# Patient Record
Sex: MALE | Race: WHITE
[De-identification: names, ages, dates, MRNs, and addresses within clinical notes are randomized per-mention and may not be internally consistent; named-entity substitution may affect disease eponyms.]

---
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
## 2021-04-05 ENCOUNTER — HOSPITAL ENCOUNTER (EMERGENCY)
Dept: HOSPITAL 97 - ER | Age: 21
LOS: 1 days | Discharge: HOME | End: 2021-04-06
Payer: COMMERCIAL

## 2021-04-05 DIAGNOSIS — T18.128A: Primary | ICD-10-CM

## 2021-04-05 DIAGNOSIS — Z72.0: ICD-10-CM

## 2021-04-05 LAB
HCT VFR BLD CALC: 41.2 % (ref 39.6–49)
LYMPHOCYTES # SPEC AUTO: 2.2 K/UL (ref 0.7–4.9)
PMV BLD: 8.9 FL (ref 7.6–11.3)
RBC # BLD: 4.74 M/UL (ref 4.33–5.43)

## 2021-04-05 PROCEDURE — 71045 X-RAY EXAM CHEST 1 VIEW: CPT

## 2021-04-05 PROCEDURE — 99284 EMERGENCY DEPT VISIT MOD MDM: CPT

## 2021-04-05 PROCEDURE — 85025 COMPLETE CBC W/AUTO DIFF WBC: CPT

## 2021-04-05 PROCEDURE — 36415 COLL VENOUS BLD VENIPUNCTURE: CPT

## 2021-04-05 PROCEDURE — 80048 BASIC METABOLIC PNL TOTAL CA: CPT

## 2021-04-05 PROCEDURE — 96374 THER/PROPH/DIAG INJ IV PUSH: CPT

## 2021-04-06 LAB
BUN BLD-MCNC: 9 MG/DL (ref 7–18)
GLUCOSE SERPLBLD-MCNC: 112 MG/DL (ref 74–106)
POTASSIUM SERPL-SCNC: 3.5 MMOL/L (ref 3.5–5.1)

## 2021-04-06 NOTE — ER
Nurse's Notes                                                                                     

 [facility]                                                                 

Name: Eloy Saul                                                                               

Age: 20 yrs                                                                                       

Sex: Male                                                                                         

: 2000                                                                                   

MRN: [id number]                                                                                   

Arrival Date: 2021                                                                          

Time: 22:47                                                                                       

Account#: [id number]                                                                            

Bed 4                                                                                             

Private MD:                                                                                       

Diagnosis: Foreign body ( piece of chicken ) lodged in esophagus ( Resolved )                     

                                                                                                  

Presentation:                                                                                     

                                                                                             

22:47 Chief complaint: EMS states: Pt was eating chicken and swallowed a piece he thought was jb4 

      too large. He reports feeling like it is stuck and cannot swallow anything. Coronavirus     

      screen: Client denies travel out of the [place] in the last 14 days. At this time, the         

      client does not indicate any symptoms associated with coronavirus-19. Ebola Screen: No      

      symptoms or risks identified at this time. Initial Sepsis Screen: Does the patient meet     

      any 2 criteria? No. Patient's initial sepsis screen is negative. Does the patient have      

      a suspected source of infection? No. Patient's initial sepsis screen is negative. Risk      

      Assessment: Do you want to hurt yourself or someone else? Patient reports no desire to      

      harm self or others. Onset of symptoms was 2021.                                  

22:47 Method Of Arrival: EMS: [facility]                                                       jb4 

22:47 Acuity: ALBERTO 3                                                                           jb4 

                                                                                                  

Triage Assessment:                                                                                

22:49 General: Appears in no apparent distress. uncomfortable, Behavior is calm, cooperative, jb4 

      appropriate for age. Pain: Complains of pain in throat Pain does not radiate. Pain          

      currently is 5 out of 10 on a pain scale. EENT: Throat is clear is pink with gag reflex     

      present. Neuro: Level of Consciousness is awake, alert, obeys commands, Oriented to         

      person, place, time, situation. Cardiovascular: Patient's skin is warm and dry.             

      Respiratory: Airway is compromised Respiratory effort is even, unlabored, Respiratory       

      pattern is regular, symmetrical. GI: No signs and/or symptoms were reported involving       

      the gastrointestinal system. : No signs and/or symptoms were reported regarding the       

      genitourinary system. Derm: Skin is intact, Skin is pink, warm \T\ dry. Musculoskeletal:    

      Circulation, motion, and sensation intact. Range of motion: intact in all extremities.      

                                                                                                  

Historical:                                                                                       

- Allergies:                                                                                      

22:49 No Known Allergies;                                                                     jb4 

- Home Meds:                                                                                      

22:49 None [Active];                                                                          jb4 

- PMHx:                                                                                           

22:49 Asthma; Migraines; seasonal allergies;                                                  jb4 

- PSHx:                                                                                           

22:49 None;                                                                                   jb4 

                                                                                                  

- Immunization history:: Adult Immunizations up to date.                                          

- Social history:: Smoking status: Patient reports the use of cigarette tobacco                   

  products, Patient uses street drugs, marijuana, Patient/guardian denies using alcohol.          

                                                                                                  

                                                                                                  

Screenin:49 Abuse screen: Denies threats or abuse. Nutritional screening: No deficits noted.        jb4 

      Tuberculosis screening: No symptoms or risk factors identified.                             

22:49 Fall Risk None identified.                                                              jb4 

                                                                                                  

Assessment:                                                                                       

22:49 General: see triage note..                                                              jb4 

23:58 Reassessment: Patient appears in no apparent distress at this time. Patient and/or      jb4 

      family updated on plan of care and expected duration. Pain level reassessed. Patient is     

      alert, oriented x 3, equal unlabored respirations, skin warm/dry/pink. Pt reports that      

      it feels as though the food passed. Requested water to try and swallow. Provider            

      notified, Provider okayed pt to try and drink water. Pt was able to swallow water           

      without immediately vomiting.                                                               

                                                                                                  

Vital Signs:                                                                                      

22:47  / 79; Pulse 88; Resp 16; Temp 98.5(O); Pulse Ox 100% on R/A; Weight 65.77 kg     jb4 

      (R); Height 5 ft. 4 in. (162.56 cm) (R); Pain 6/10;                                         

23:59  / 69; Pulse 58; Resp 16; Pulse Ox 98% on R/A;                                    jb4 

22:47 Body Mass Index 24.89 (65.77 kg, 162.56 cm)                                             jb4 

                                                                                                  

ED Course:                                                                                        

22:47 Patient arrived in ED.                                                                  jb4 

22:48 Triage completed.                                                                       jb4 

22:49 Arm band placed on right wrist.                                                         jb4 

22:49 Patient has correct armband on for positive identification. Placed in gown. Bed in low  jb4 

      position. Call light in reach. Side rails up X 1. Pulse ox on. NIBP on.                     

23:11 Valdemar Abdullahi MD is Attending Physician.                                                    pkl 

23:18 Dutch Beaulieu, RN is Primary Nurse.                                                     jb4 

23:30 Initial lab(s) drawn, by me, sent to lab. Inserted saline lock: 18 gauge in right       jb4 

      antecubital area, using aseptic technique. Blood collected.                                 

23:55 XRAY CXR (1 view) In Process Unspecified.                                               EDMS

                                                                                             

00:21 No provider procedures requiring assistance completed. IV discontinued, intact,         jb4 

      bleeding controlled, No redness/swelling at site. Pressure dressing applied.                

                                                                                                  

Administered Medications:                                                                         

      Discontinued: NS 0.9% 1000 ml IV at 125 ml/hr continuous                                    

                                                                                             

23:37 Drug: NS 0.9% 1000 ml Route: IV; Rate: 125 ml/hr; Site: right antecubital;              jb4 

23:37 Drug: GlucaGen (glucagon) 1 mg Route: IVP; Site: right antecubital;                     jb4 

23:55 Follow up: Response: No adverse reaction; Marked relief of symptoms                     jb4 

                                                                                             

00:02 Not Given (Physician Discretion): Procardia 10 mg PO once                               jb4 

                                                                                                  

                                                                                                  

Outcome:                                                                                          

00:12 Discharge ordered by MD.                                                                kailey 

00:22 Discharged to home ambulatory.                                                          jb4 

00:22 Condition: stable                                                                           

00:22 Discharge instructions given to patient, Instructed on discharge instructions, follow       

      up and referral plans. Demonstrated understanding of instructions, follow-up care.          

00:22 Patient left the ED.                                                                    jb4 

                                                                                                  

Signatures:                                                                                       

Dispatcher [employer]                           EDValdemar Singh MD MD pkl Bryson, James, RN                       RN   jb4                                                  

                                                                                                  

**************************************************************************************************

## 2021-04-06 NOTE — RAD REPORT
EXAM DESCRIPTION:  Darren Single View4/5/2021 11:55 pm

 

CLINICAL HISTORY:  Foreign body in esophagus

 

COMPARISON:  2009

 

FINDINGS:   The lungs appear clear of acute infiltrate. The heart is normal size. A radiopaque foreig
n body is not seen on this limited examination

## 2021-04-06 NOTE — EDPHYS
Physician Documentation                                                                           

 [facility]                                                                 

Name: Eloy Saul                                                                               

Age: 20 yrs                                                                                       

Sex: Male                                                                                         

: 2000                                                                                   

MRN: [id number]                                                                                   

Arrival Date: 2021                                                                          

Time: 22:47                                                                                       

Account#: [id number]                                                                            

Bed 4                                                                                             

Private MD:                                                                                       

ED Physician Valdemar Abdlulahi                                                                             

HPI:                                                                                              

                                                                                             

00:02 This 20 yrs old  Male presents to ER via EMS with unknown complaint.           pkl 

00:02 The patient or guardian reports the patient has a suspected foreign body, Patient       pkl 

      swallowed a piece of chicken and was lodged in the esophagus earlier today.                 

                                                                                                  

Historical:                                                                                       

- Allergies:                                                                                      

                                                                                             

22:49 No Known Allergies;                                                                     jb4 

- Home Meds:                                                                                      

22:49 None [Active];                                                                          jb4 

- PMHx:                                                                                           

22:49 Asthma; Migraines; seasonal allergies;                                                  jb4 

- PSHx:                                                                                           

22:49 None;                                                                                   jb4 

                                                                                                  

- Immunization history:: Adult Immunizations up to date.                                          

- Social history:: Smoking status: Patient reports the use of cigarette tobacco                   

  products, Patient uses street drugs, marijuana, Patient/guardian denies using alcohol.          

                                                                                                  

                                                                                                  

ROS:                                                                                              

                                                                                             

00:02 Eyes: Negative for injury, pain, redness, and discharge, ENT: Negative for injury,      pkl 

      pain, and discharge, Neck: Negative for injury, pain, and swelling, Cardiovascular:         

      Negative for chest pain, palpitations, and edema, Respiratory: Negative for shortness       

      of breath, cough, wheezing, and pleuritic chest pain, Abdomen/GI: Negative for              

      abdominal pain, nausea, vomiting, diarrhea, and constipation, Back: Negative for injury     

      and pain, : Negative for injury, bleeding, discharge, and swelling, MS/Extremity:         

      Negative for injury and deformity, Skin: Negative for injury, rash, and discoloration,      

      Neuro: Negative for headache, weakness, numbness, tingling, and seizure.                    

                                                                                                  

Exam:                                                                                             

00:02 Head/Face:  Normocephalic, atraumatic. Eyes:  Pupils equal round and reactive to light, pkl 

      extra-ocular motions intact.  Lids and lashes normal.  Conjunctiva and sclera are           

      non-icteric and not injected.  Cornea within normal limits.  Periorbital areas with no      

      swelling, redness, or edema.                                                                

00:02 ENT: Unable  to  swallow  any  liquids.                                                     

00:02 Neck: Exam negative for acute changes.                                                      

00:02 Chest/axilla: Exam negative for acute changes.                                              

00:02 Cardiovascular: Rate: normal, Rhythm: regular.                                              

00:02 Respiratory: the patient does not display signs of respiratory distress,  Respirations:     

      normal, Breath sounds: are clear throughout.                                                

00:02 Abdomen/GI: Bowel sounds: normal, Palpation: abdomen is soft and non-tender, in all         

      quadrants.                                                                                  

00:02 Back: Exam negative for acute changes.                                                      

00:02 : Exam negative for acute changes.                                                        

00:02 Musculoskeletal/extremity: Exam is negative for acute changes.                              

00:02 Skin: Exam negative for rash.                                                               

00:02 Neuro: Orientation: is normal, Mentation: is normal, Cranial nerves: grossly normal,        

      Motor: is normal.                                                                           

                                                                                                  

Vital Signs:                                                                                      

                                                                                             

22:47  / 79; Pulse 88; Resp 16; Temp 98.5(O); Pulse Ox 100% on R/A; Weight 65.77 kg     jb4 

      (R); Height 5 ft. 4 in. (162.56 cm) (R); Pain 6/10;                                         

23:59  / 69; Pulse 58; Resp 16; Pulse Ox 98% on R/A;                                    jb4 

22:47 Body Mass Index 24.89 (65.77 kg, 162.56 cm)                                             jb4 

                                                                                                  

MDM:                                                                                              

                                                                                             

00:02 Data reviewed: vital signs, nurses notes.                                               pkl 

00:02 ED course: Patient responded well after IV Glucagon. Able to swallow liquids without    pkl 

      difficulty.                                                                                 

00:12 Patient medically screened.                                                             pkl 

                                                                                                  

                                                                                             

23:18 Order name: CBC with Diff; Complete Time: 00:15                                         pkl 

                                                                                             

23:18 Order name: Chem 7; Complete Time: 00:15                                                pkl 

                                                                                             

23:18 Order name: XRAY CXR (1 view)                                                           pkl 

                                                                                                  

Administered Medications:                                                                         

      Discontinued: NS 0.9% 1000 ml IV at 125 ml/hr continuous                                    

                                                                                             

23:37 Drug: NS 0.9% 1000 ml Route: IV; Rate: 125 ml/hr; Site: right antecubital;              jb 

23:37 Drug: GlucaGen (glucagon) 1 mg Route: IVP; Site: right antecubital;                     jb4 

23:55 Follow up: Response: No adverse reaction; Marked relief of symptoms                     [facility] 

                                                                                             

00:02 Not Given (Physician Discretion): Procardia 10 mg PO once                               jb4 

                                                                                                  

                                                                                                  

Disposition:                                                                                      

21 00:12 Discharged to Home. Impression: Foreign body ( piece of chicken ) lodged in        

  esophagus ( Resolved ).                                                                         

- Condition is Stable.                                                                            

                                                                                                  

                                                                                                  

- Medication Reconciliation Form, Thank You Letter, Antibiotic Education, Prescription            

  Opioid Use form.                                                                                

- Follow up: Private Physician; When: 2 - 3 days; Reason: Re-evaluation by your                   

  physician.                                                                                      

- Problem is new.                                                                                 

- Symptoms are resolved.                                                                          

                                                                                                  

                                                                                                  

                                                                                                  

Signatures:                                                                                       

Dispatcher [employer]                           EDMS                                                 

Valdemar Abdullahi MD MD   pkl                                                  

Dutch Beaulieu RN                       RN   jb4                                                  

                                                                                                  

Corrections: (The following items were deleted from the chart)                                    

00:22 00:12 2021 00:12 Discharged to Home. Impression: Foreign body ( piece of chicken  jb4 

      ) lodged in esophagus ( Resolved ). Condition is Stable. Forms are Medication               

      Reconciliation Form, Thank You Letter, Antibiotic Education, Prescription Opioid Use.       

      Follow up: Private Physician; When: 2 - 3 days; Reason: Re-evaluation by your               

      physician. Problem is new. Symptoms are resolved. pkl                                       

                                                                                                  

**************************************************************************************************

## 2021-10-16 ENCOUNTER — HOSPITAL ENCOUNTER (EMERGENCY)
Dept: HOSPITAL 97 - ER | Age: 21
Discharge: HOME | End: 2021-10-16
Payer: COMMERCIAL

## 2021-10-16 VITALS — DIASTOLIC BLOOD PRESSURE: 77 MMHG | SYSTOLIC BLOOD PRESSURE: 126 MMHG | OXYGEN SATURATION: 100 % | TEMPERATURE: 98.3 F

## 2021-10-16 DIAGNOSIS — R10.84: ICD-10-CM

## 2021-10-16 DIAGNOSIS — R19.7: Primary | ICD-10-CM

## 2021-10-16 LAB
ALBUMIN SERPL BCP-MCNC: 4.8 G/DL (ref 3.4–5)
ALP SERPL-CCNC: 75 U/L (ref 45–117)
ALT SERPL W P-5'-P-CCNC: 29 U/L (ref 12–78)
AST SERPL W P-5'-P-CCNC: 25 U/L (ref 15–37)
BUN BLD-MCNC: 9 MG/DL (ref 7–18)
GLUCOSE SERPLBLD-MCNC: 105 MG/DL (ref 74–106)
HCT VFR BLD CALC: 44.2 % (ref 39.6–49)
LIPASE SERPL-CCNC: 66 U/L (ref 73–393)
LYMPHOCYTES # SPEC AUTO: 1.8 K/UL (ref 0.7–4.9)
PMV BLD: 9 FL (ref 7.6–11.3)
POTASSIUM SERPL-SCNC: 4 MMOL/L (ref 3.5–5.1)
RBC # BLD: 5.03 M/UL (ref 4.33–5.43)

## 2021-10-16 PROCEDURE — 80076 HEPATIC FUNCTION PANEL: CPT

## 2021-10-16 PROCEDURE — 96361 HYDRATE IV INFUSION ADD-ON: CPT

## 2021-10-16 PROCEDURE — 71045 X-RAY EXAM CHEST 1 VIEW: CPT

## 2021-10-16 PROCEDURE — 99284 EMERGENCY DEPT VISIT MOD MDM: CPT

## 2021-10-16 PROCEDURE — 74177 CT ABD & PELVIS W/CONTRAST: CPT

## 2021-10-16 PROCEDURE — 83690 ASSAY OF LIPASE: CPT

## 2021-10-16 PROCEDURE — 96375 TX/PRO/DX INJ NEW DRUG ADDON: CPT

## 2021-10-16 PROCEDURE — 96374 THER/PROPH/DIAG INJ IV PUSH: CPT

## 2021-10-16 PROCEDURE — 36415 COLL VENOUS BLD VENIPUNCTURE: CPT

## 2021-10-16 PROCEDURE — 85025 COMPLETE CBC W/AUTO DIFF WBC: CPT

## 2021-10-16 PROCEDURE — 80048 BASIC METABOLIC PNL TOTAL CA: CPT

## 2021-10-16 NOTE — RAD REPORT
EXAM DESCRIPTION:  RAD - Chest Single View - 10/16/2021 6:40 pm

 

CLINICAL HISTORY:  COUGH

 

COMPARISON:  April 5

 

TECHNIQUE:  AP portable chest image was obtained 10/16/2021 6:40 pm .

 

FINDINGS:  Lungs are clear. Heart and vasculature are normal. No measurable pleural effusion and no p
neumothorax. No acute bony abnormality seen. No acute aortic findings suspected.

 

IMPRESSION:  No acute cardiopulmonary process.

 

No significant change from comparison study.

## 2021-10-16 NOTE — EDPHYS
Physician Documentation                                                                           

 [facility]                                                                 

Name: Eloy Saul                                                                               

Age: 20 yrs                                                                                       

Sex: Male                                                                                         

: 2000                                                                                   

MRN: [id number]                                                                                   

Arrival Date: 10/16/2021                                                                          

Time: 14:22                                                                                       

Account#: [id number]                                                                            

Bed 23                                                                                            

Private MD:                                                                                       

ED Physician Adolph Yusuf                                                                      

HPI:                                                                                              

10/16                                                                                             

16:53 This 20 yrs old  Male presents to ER via EMS with complaints of Vomiting.      jayesh 

16:53 The patient presents to the emergency department with.                                  jayesh 

                                                                                                  

Historical:                                                                                       

- Allergies:                                                                                      

16:35 No Known Allergies;                                                                     lp1 

- Home Meds:                                                                                      

16:35 None [Active];                                                                          lp1 

- PMHx:                                                                                           

16:35 Asthma; Migraines; seasonal allergies;                                                  lp1 

                                                                                                  

- Immunization history:: Adult Immunizations up to date.                                          

- Social history:: Smoking status: Patient denies any tobacco usage or history of.                

                                                                                                  

                                                                                                  

ROS:                                                                                              

17:43 Constitutional: Negative for fever, chills, and weight loss, Eyes: Negative for injury, jayesh 

      pain, redness, and discharge, ENT: Negative for injury, pain, and discharge, Neck:          

      Negative for injury, pain, and swelling, Cardiovascular: Negative for chest pain,           

      palpitations, and edema, Respiratory: Negative for shortness of breath, cough,              

      wheezing, and pleuritic chest pain, Back: Negative for injury and pain, : Negative        

      for injury, bleeding, discharge, and swelling, MS/Extremity: Negative for injury and        

      deformity, Skin: Negative for injury, rash, and discoloration, Neuro: Negative for          

      headache, weakness, numbness, tingling, and seizure, Psych: Negative for depression,        

      anxiety, suicide ideation, homicidal ideation, and hallucinations, Allergy/Immunology:      

      Negative for hives, rash, and allergies, Endocrine: Negative for neck swelling,             

      polydipsia, polyuria, polyphagia, and marked weight changes, Hematologic/Lymphatic:         

      Negative for swollen nodes, abnormal bleeding, and unusual bruising.                        

17:43 Abdomen/GI: Positive for abdominal pain, nausea and vomiting, diarrhea, of the              

      suprapubic area, right upper quadrant, left upper quadrant, right lower quadrant and        

      left lower quadrant.                                                                        

                                                                                                  

Exam:                                                                                             

17:43 Constitutional:  This is a well developed, well nourished patient who is awake, alert,  jayesh 

      and in no acute distress. Head/Face:  Normocephalic, atraumatic. Eyes:  Pupils equal        

      round and reactive to light, extra-ocular motions intact.  Lids and lashes normal.          

      Conjunctiva and sclera are non-icteric and not injected.  Cornea within normal limits.      

      Periorbital areas with no swelling, redness, or edema. ENT:  Nares patent. No nasal         

      discharge, no septal abnormalities noted.  Tympanic membranes are normal and external       

      auditory canals are clear.  Oropharynx with no redness, swelling, or masses, exudates,      

      or evidence of obstruction, uvula midline.  Mucous membranes moist. Neck:  Trachea          

      midline, no thyromegaly or masses palpated, and no cervical lymphadenopathy.  Supple,       

      full range of motion without nuchal rigidity, or vertebral point tenderness.  No            

      Meningismus. Chest/axilla:  Normal chest wall appearance and motion.  Nontender with no     

      deformity.  No lesions are appreciated. Cardiovascular:  Regular rate and rhythm with a     

      normal S1 and S2.  No gallops, murmurs, or rubs.  Normal PMI, no JVD.  No pulse             

      deficits. Respiratory:  Lungs have equal breath sounds bilaterally, clear to                

      auscultation and percussion.  No rales, rhonchi or wheezes noted.  No increased work of     

      breathing, no retractions or nasal flaring. Back:  No spinal tenderness.  No                

      costovertebral tenderness.  Full range of motion. Male :  Normal genitalia with no        

      discharge or lesions. Skin:  Warm, dry with normal turgor.  Normal color with no            

      rashes, no lesions, and no evidence of cellulitis. MS/ Extremity:  Pulses equal, no         

      cyanosis.  Neurovascular intact.  Full, normal range of motion. Neuro:  Awake and           

      alert, GCS 15, oriented to person, place, time, and situation.  Cranial nerves II-XII       

      grossly intact.  Motor strength 5/5 in all extremities.  Sensory grossly intact.            

      Cerebellar exam normal.  Normal gait. Psych:  Awake, alert, with orientation to person,     

      place and time.  Behavior, mood, and affect are within normal limits.                       

17:43 Abdomen/GI: Inspection: abdomen appears normal, Bowel sounds: normal, Palpation: mild       

      abdominal tenderness, in all quadrants, Liver: no appreciated palpable abnormalities,       

      Hernia: not appreciated.                                                                    

                                                                                                  

Vital Signs:                                                                                      

14:22  / 81; Pulse 56; Resp 16; Temp 97.8(O); Pulse Ox 100% on R/A; Weight 63.5 kg (R); lp1 

      Height 5 ft. 5 in. (165.10 cm); Pain 0/10;                                                  

17:01  / 79; Pulse 53; Resp 18; Pulse Ox 100% on R/A;                                   aj1 

18:20  / 80; Pulse 51; Resp 16; Pulse Ox 99% ;                                          aj1 

19:29  / 77 LA Sitting (auto/reg); Pulse 75; Resp 14 S; Temp 98.3(O); Pulse Ox 100% on  sj1 

      R/A; Pain 0/10;                                                                             

14:22 Body Mass Index 23.30 (63.50 kg, 165.10 cm)                                             lp1 

                                                                                                  

MDM:                                                                                              

16:52 Patient medically screened.                                                             jayesh 

17:44 Differential diagnosis: Nonspecific abd pain. Data reviewed: vital signs, nurses notes, [facility] 

      lab test result(s), EKG, radiologic studies, CT scan, plain films.                          

17:47 Data interpreted: Cardiac monitor: not applicable for this patient encounter. rate is   jayesh 

      53 beats/min, rhythm is regular, Pulse oximetry: on room air is 100 %. Counseling: I        

      had a detailed discussion with the patient and/or guardian regarding: the historical        

      points, exam findings, and any diagnostic results supporting the discharge/admit            

      diagnosis, lab results, radiology results, the need for outpatient follow up, for           

      definitive care, a family practitioner, a gastroenterologist.                               

                                                                                                  

10/16                                                                                             

14:52 Order name: Basic Metabolic Panel                                                       1 

10/16                                                                                             

14:52 Order name: CBC with Diff                                                               lp1 

10/16                                                                                             

14:52 Order name: Hepatic Function                                                            lp1 

10/16                                                                                             

14:52 Order name: Lipase; Complete Time: 16:52                                                [facility] 

10/16                                                                                             

14:53 Order name: Basic Metabolic Panel; Complete Time: 16:52                                 EDMS

10/16                                                                                             

14:53 Order name: CBC with Automated Diff; Complete Time: 16:52                               EDMS

10/16                                                                                             

14:53 Order name: Liver (Hepatic) Function; Complete Time: 16:52                              EDMS

10/16                                                                                             

17:43 Order name: Chest Single View XRAY                                                      [facility] 

10/16                                                                                             

17:43 Order name: CT Abd/Pelvis - IV Contrast Only                                            [facility] 

10/16                                                                                             

14:52 Order name: IV Saline Lock; Complete Time: 15:00                                        lp1 

10/16                                                                                             

14:52 Order name: Labs collected and sent; Complete Time: 15:00                               lp1 

                                                                                                  

Administered Medications:                                                                         

15:07 Drug: NS 0.9% 1000 ml Route: IV; Rate: 1000 ml; Site: right antecubital;                lp1 

19:16 Follow up: IV Status: Completed infusion; IV Intake: 1000ml                             aj1 

15:07 Drug: Phenergan (promethazine) 12.5 mg Route: IVP; Site: right antecubital;             lp1 

19:15 Follow up: Response: No adverse reaction                                                aj1 

18:19 Drug: NS 0.9% 1000 ml Route: IV; Rate: 1 bolus; Site: right antecubital;                aj1 

18:19 Drug: Zofran (Ondansetron) 4 mg Route: IVP; Site: right antecubital;                    aj1 

19:16 Follow up: Response: No adverse reaction                                                aj1 

19:16 Not Given (Patient Refused): morphine 2 mg IVP once; RASS on ADMIN: Combtv4, Very       aj1 

      Agttd3, Agttd2, Rstlss1, AlertClm0, Drwsy-1, Lt Sdtn-2, Mod Sdtn-3, Dp Sdtn-4,              

      UnArsble-5                                                                                  

19:16 Not Given (Physician Discretion): NS 0.9% 1000 ml IV at 1 bolus Per protocol; 1000 mL   aj1 

      bolus                                                                                       

                                                                                                  

                                                                                                  

Disposition Summary:                                                                              

10/16/21 18:38                                                                                    

Discharge Ordered                                                                                 

      Location: Home                                                                          jayesh 

      Problem: new                                                                            jayesh 

      Symptoms: have improved                                                                 jayesh 

      Condition: Stable                                                                       jayesh 

      Diagnosis                                                                                   

        - Vomiting                                                                            jayesh 

        - Diarrhea, unspecified                                                               jayesh 

        - Abdominal pain, Generalized                                                         jayesh 

      Followup:                                                                               jayesh 

        - With: Private Physician                                                                  

        - When: 2 - 3 days                                                                         

        - Reason: Recheck today's complaints, Continuance of care, Re-evaluation by your           

      physician                                                                                   

      Followup:                                                                               jayesh 

        - With:                                                                                    

        - When: 2 - 3 days                                                                         

        - Reason: Recheck today's complaints, Re-evaluation by your physician                      

      Discharge Instructions:                                                                     

        - Discharge Summary Sheet                                                             jayesh 

        - Abdominal Pain, Adult                                                               jayesh 

        - Food Choices to Help Relieve Diarrhea, Adult                                        jayesh 

        - Diarrhea, Adult                                                                     jayesh 

        - Abdominal Pain, Adult, Easy-to-Read                                                 jayesh 

        - Diarrhea, Adult, Easy-to-Read                                                       jayesh 

      Forms:                                                                                      

        - Medication Reconciliation Form                                                      jayesh 

        - Thank You Letter                                                                    jayesh 

        - Antibiotic Education                                                                jayesh 

        - Prescription Opioid Use                                                             jayesh 

      Prescriptions:                                                                              

        - Pepcid 20 mg Oral Tablet                                                                 

            - take 1 tablet by ORAL route every 12 hours for 15 days; 30 tablet; Refills: 0,  jayesh 

      Product Selection Permitted                                                                 

        - Zofran 4 mg Oral Tablet                                                                  

            - take 1 tablet by ORAL route every 12 hours As needed; 20 tablet; Refills: 0,    jayesh 

      Product Selection Permitted                                                                 

        - dicyclomine 20 mg Oral Tablet                                                            

            - take 1 tablet by ORAL route 4 times per day; 28 tablet; Refills: 0, Product     jayesh 

      Selection Permitted                                                                         

Signatures:                                                                                       

Dispatcher [employer]                           Katelynn Morris, RN                     RN   aj1                                                  

Adolph Yusuf MD MD cha Pena, Laura, RN                         RN   lp1                                                  

                                                                                                  

Corrections: (The following items were deleted from the chart)                                    

19:31 18:14 Urine Dipstick-Ancillary ordered. jayesh                                             sj1 

                                                                                                  

**************************************************************************************************

## 2021-10-16 NOTE — RAD REPORT
EXAM DESCRIPTION:  CT - Abdomen   Pelvis W Contrast - 10/16/2021 5:58 pm

 

CLINICAL HISTORY:  ABD PAIN

 

COMPARISON:  <Comparisons>

 

TECHNIQUE:  Biphasic, helical CT imaging of the abdomen and pelvis was performed following 100 ml non
-ionic IV contrast.

 

No oral contrast administered.

 

All CT scans are performed using dose optimization technique as appropriate and may include automated
 exposure control or mA/KV adjustment according to patient size.

 

FINDINGS:  No suspicious findings in the lung bases.

 

The liver, spleen, and pancreas show no suspicious findings. Gallbladder and biliary tree are also wi
thout suspicious finding.

 

Symmetric renal function is seen with no hydronephrosis or suspicious renal mass. No pyelonephritis o
r acute parenchymal process. No bladder abnormalities. No adrenal abnormalities.

 

No dilated bowel loops or bowel wall thickening. No appendicitis findings. No free air, free fluid or
 inflammatory stranding.  No hernia, mass or bulky lymphadenopathy.

 

No suspicious bony findings.

 

 

IMPRESSION:  Contrast enhanced CT abdomen and pelvis showing no significant or suspicious finding.

## 2021-10-16 NOTE — ER
Nurse's Notes                                                                                     

 [facility]                                                                 

Name: Eloy Saul                                                                               

Age: 20 yrs                                                                                       

Sex: Male                                                                                         

: 2000                                                                                   

MRN: [id number]                                                                                   

Arrival Date: 10/16/2021                                                                          

Time: 14:22                                                                                       

Account#: [id number]                                                                            

Bed 23                                                                                            

Private MD:                                                                                       

Diagnosis: Vomiting;Diarrhea, unspecified;Abdominal pain, Generalized                             

                                                                                                  

Presentation:                                                                                     

10/16                                                                                             

14:22 Chief complaint: EMS states: Called for vomiting that began at 0700 today, reports may  lp1 

      have eaten something bad; patient's mother gave him Phenergan and Zofran without            

      relief; EMS administered Phenergan 12.5mg IV and NS 250ml; Patient reports continued        

      nausea.                                                                                     

14:22 Coronavirus screen: At this time, the client does not indicate any symptoms associated  lp1 

      with coronavirus-19. Ebola Screen: No symptoms or risks identified at this time.            

      Initial Sepsis Screen: Does the patient meet any 2 criteria? No. Patient's initial          

      sepsis screen is negative. Does the patient have a suspected source of infection? No.       

      Patient's initial sepsis screen is negative. Risk Assessment: Do you want to hurt           

      yourself or someone else? Patient reports no desire to harm self or others. Onset of        

      symptoms was 2021.                                                              

14:22 Method Of Arrival: EMS: [facility]                                                lp1 

14:22 Acuity: ALBERTO 3                                                                           lp1 

14:59 Care prior to arrival: IV initiated. 20 GA, in the right antecubital area.              lp1 

                                                                                                  

Historical:                                                                                       

- Allergies:                                                                                      

16:35 No Known Allergies;                                                                     lp1 

- Home Meds:                                                                                      

16:35 None [Active];                                                                          lp1 

- PMHx:                                                                                           

16:35 Asthma; Migraines; seasonal allergies;                                                  lp1 

                                                                                                  

- Immunization history:: Adult Immunizations up to date.                                          

- Social history:: Smoking status: Patient denies any tobacco usage or history of.                

                                                                                                  

                                                                                                  

Screenin:35 Abuse screen: Denies threats or abuse. Denies injuries from another. Nutritional        lp1 

      screening: No deficits noted. Tuberculosis screening: No symptoms or risk factors           

      identified. Fall Risk None identified.                                                      

                                                                                                  

Assessment:                                                                                       

17:01 General: Appears in no apparent distress. uncomfortable, Behavior is calm, cooperative, aj1 

      appropriate for age. Pain: Denies pain. Neuro: Level of Consciousness is awake, alert,      

      obeys commands, Oriented to person, place, time, situation. Cardiovascular: Patient's       

      skin is warm and dry. Respiratory: Airway is patent Respiratory effort is even,             

      unlabored, Respiratory pattern is regular, symmetrical. GI: Abdomen is flat,                

      non-distended, Bowel sounds present X 4 quads. Abd is soft X 4 quads Reports diarrhea,      

      nausea, vomiting. : No signs and/or symptoms were reported regarding the                  

      genitourinary system. EENT: No signs and/or symptoms were reported regarding the EENT       

      system. Derm: Skin is pale. Musculoskeletal: No signs and/or symptoms reported              

      regarding the musculoskeletal system. Circulation, motion, and sensation intact.            

18:19 Reassessment: Patient appears in no apparent distress at this time. No changes from     aj1 

      previously documented assessment. Patient and/or family updated on plan of care and         

      expected duration. Pain level reassessed. Patient is alert, oriented x 3, equal             

      unlabored respirations, skin warm/dry/pink.                                                 

                                                                                                  

Vital Signs:                                                                                      

14:22  / 81; Pulse 56; Resp 16; Temp 97.8(O); Pulse Ox 100% on R/A; Weight 63.5 kg (R); lp1 

      Height 5 ft. 5 in. (165.10 cm); Pain 0/10;                                                  

17:01  / 79; Pulse 53; Resp 18; Pulse Ox 100% on R/A;                                   aj1 

18:20  / 80; Pulse 51; Resp 16; Pulse Ox 99% ;                                          aj1 

19:29  / 77 LA Sitting (auto/reg); Pulse 75; Resp 14 S; Temp 98.3(O); Pulse Ox 100% on  sj1 

      R/A; Pain 0/10;                                                                             

14:22 Body Mass Index 23.30 (63.50 kg, 165.10 cm)                                             lp1 

                                                                                                  

ED Course:                                                                                        

14:22 Patient arrived in ED.                                                                  am2 

14:52 Triage completed.                                                                       lp1 

14:52 Arm band placed on.                                                                     lp1 

15:07 Initial lab(s) drawn, by me, sent to lab. Maintain EMS IV. Dressing intact. Good blood  lp1 

      return noted. Site clean \T\ dry. Gauge \T\ site: 20g to R AC.                              

16:35 Patient has correct armband on for positive identification.                             lp1 

16:52 Adolph Yusuf MD is Attending Physician.                                             [facility] 

16:57 Katelynn Banuelos RN is Primary Nurse.                                                   aj1 

17:01 No provider procedures requiring assistance completed.                                  aj1 

17:57 CT Abd/Pelvis - IV Contrast Only In Process Unspecified.                                EDMS

18:38 Vickie Guajardo MD is Referral Physician.                                                  [facility] 

18:40 Chest Single View XRAY In Process Unspecified.                                          EDMS

19:29 IV discontinued, intact, bleeding controlled, No redness/swelling at site.              sj1 

19:31 Basic Metabolic Panel Sent.                                                             sj1 

19:31 CBC with Diff Sent.                                                                     sj1 

19:31 Hepatic Function Sent.                                                                  sj1 

                                                                                                  

Administered Medications:                                                                         

15:07 Drug: NS 0.9% 1000 ml Route: IV; Rate: 1000 ml; Site: right antecubital;                lp1 

19:16 Follow up: IV Status: Completed infusion; IV Intake: 1000ml                             aj1 

15:07 Drug: Phenergan (promethazine) 12.5 mg Route: IVP; Site: right antecubital;             lp1 

19:15 Follow up: Response: No adverse reaction                                                aj1 

18:19 Drug: NS 0.9% 1000 ml Route: IV; Rate: 1 bolus; Site: right antecubital;                aj1 

18:19 Drug: Zofran (Ondansetron) 4 mg Route: IVP; Site: right antecubital;                    aj1 

19:16 Follow up: Response: No adverse reaction                                                aj1 

19:16 Not Given (Patient Refused): morphine 2 mg IVP once; RASS on ADMIN: Combtv4, Very       aj1 

      Agttd3, Agttd2, Rstlss1, AlertClm0, Drwsy-1, Lt Sdtn-2, Mod Sdtn-3, Dp Sdtn-4,              

      UnArsble-5                                                                                  

19:16 Not Given (Physician Discretion): NS 0.9% 1000 ml IV at 1 bolus Per protocol; 1000 mL   aj1 

      bolus                                                                                       

                                                                                                  

                                                                                                  

Intake:                                                                                           

19:16 IV: 1000ml; Total: 1000ml.                                                              aj1 

                                                                                                  

Outcome:                                                                                          

18:38 Discharge ordered by MD.                                                                jayesh 

19:29 Discharged to home ambulatory.                                                          sj1 

19:29 Condition: stable                                                                           

19:29 Discharge instructions given to patient, Instructed on discharge instructions, follow       

      up and referral plans. medication usage, Demonstrated understanding of instructions,        

      follow-up care, medications.                                                                

19:32 Patient left the ED.                                                                    sj1 

                                                                                                  

Signatures:                                                                                       

Dispatcher [employer]                           EDMS                                                 

Katelynn Banuelos RN                     RN   aj1                                                  

Adolph Yusuf MD MD cha Pena, Laura RN                         RN   lp1                                                  

Pooja Cedillo                               am2                                                  

Valeria Banuelos, RN                       RN   sj1                                                  

                                                                                                  

**************************************************************************************************

## 2022-04-09 ENCOUNTER — HOSPITAL ENCOUNTER (EMERGENCY)
Dept: HOSPITAL 97 - ER | Age: 22
Discharge: HOME | End: 2022-04-09
Payer: COMMERCIAL

## 2022-04-09 VITALS — SYSTOLIC BLOOD PRESSURE: 138 MMHG | OXYGEN SATURATION: 98 % | DIASTOLIC BLOOD PRESSURE: 71 MMHG | TEMPERATURE: 97.9 F

## 2022-04-09 DIAGNOSIS — S50.812A: Primary | ICD-10-CM
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
## 2022-04-09 DIAGNOSIS — J45.909: ICD-10-CM
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
## 2022-04-09 DIAGNOSIS — F17.290: ICD-10-CM

## 2022-04-09 PROCEDURE — 99283 EMERGENCY DEPT VISIT LOW MDM: CPT

## 2022-04-09 PROCEDURE — 90471 IMMUNIZATION ADMIN: CPT

## 2022-04-09 PROCEDURE — 90714 TD VACC NO PRESV 7 YRS+ IM: CPT

## 2022-04-09 NOTE — EDPHYS
Physician Documentation                                                                           

 [facility]                                                                 

Name: Eloy Saul                                                                               

Age: 21 yrs                                                                                       

Sex: Male                                                                                         

: 2000                                                                                   

MRN: [id number]                                                                                   

Arrival Date: 2022                                                                          

Time: 20:16                                                                                       

Account#: [id number]                                                                            

Bed 11                                                                                            

Private MD:                                                                                       

ED Physician Nikhil Diaz                                                                         

HPI:                                                                                              

                                                                                             

23:01 This 21 yrs old Male presents to ER via Ambulatory with complaints of Laceration To Arm ms3 

      - Left.                                                                                     

23:01 The patient has a laceration related to: Water bath at work scratched arm occurred at   ms3 

      work, and there are no complicating factors. The injury was accidental. The                 

      laceration(s) is(are) located on the Left forearm. Onset: The symptoms/episode              

      began/occurred just prior to arrival. Associated signs and symptoms: The patient has no     

      apparent associated signs or symptoms. 21-year-old male presents after being scratched      

      at work by a water bath prior to arrival. Patient states his discomfort is a 1/10 and       

      described as stinging. Patient denies alleviating or inciting factors.                      

                                                                                                  

Historical:                                                                                       

- Allergies:                                                                                      

20:32 No Known Allergies;                                                                     ab2 

- PMHx:                                                                                           

20:32 Asthma; Migraines;                                                                      ab2 

                                                                                                  

- Immunization history:: Adult Immunizations unknown.                                             

- Social history:: Smoking status: Reported history of juuling and/or vaping.                     

                                                                                                  

                                                                                                  

ROS:                                                                                              

23:01 Constitutional: Negative for fever, and chills. Eyes: Negative for injury, pain,        ms3 

      redness, and discharge, Neck: Negative for injury, pain, and swelling, Cardiovascular:      

      Negative for chest pain, and palpitations. Respiratory: Negative for shortness of           

      breath, cough, wheezing, and pleuritic chest pain, Abdomen/GI: Negative for abdominal       

      pain, nausea, vomiting, diarrhea, and constipation, MS/Extremity: Negative for injury       

      and deformity.                                                                              

23:01 Skin: Positive for abrasion(s).                                                             

23:01 All other systems are negative.                                                             

                                                                                                  

Exam:                                                                                             

23:01 Constitutional:  This is a well developed, well nourished patient who is awake, alert,  ms3 

      and in no acute distress. Eyes:  Pupils equal round and reactive to light, extra-ocular     

      motions intact.  Lids and lashes normal.  Conjunctiva and sclera are non-icteric and        

      not injected.  Periorbital areas with no swelling, redness, or edema. Neck:  Trachea        

      midline, no cervical lymphadenopathy.  Supple, full range of motion without nuchal          

      rigidity, or vertebral point tenderness.  No Meningismus. Chest/axilla:  Normal chest       

      wall appearance and motion.  Nontender with no deformity.   Cardiovascular:  Regular        

      rate and rhythm with a normal S1 and S2.  No gallops, murmurs, or rubs.  Normal PMI, no     

      JVD.  No pulse deficits. Respiratory:  Lungs have equal breath sounds bilaterally,          

      clear to auscultation and percussion.  No rales, rhonchi or wheezes noted.  No              

      increased work of breathing, no retractions or nasal flaring. Abdomen/GI:  Soft,            

      non-tender, with normal bowel sounds.  No distension or tympany.  No guarding or            

      rebound.  No evidence of tenderness throughout.                                             

23:01 Skin: injury, abrasion(s), moderate sized abrasion noted, 6 cm(s), of the left forearm.     

                                                                                                  

Vital Signs:                                                                                      

20:28  / 71; Pulse 106; Resp 16; Temp 97.9(TE); Pulse Ox 98% on R/A; Weight 65.77 kg;   ab2 

      Height 5 ft. 5 in. (165.10 cm); Pain 0/10;                                                  

20:28 Body Mass Index 24.13 (65.77 kg, 165.10 cm)                                             ab2 

                                                                                                  

MDM:                                                                                              

20:46 Patient medically screened.                                                             ms3 

23:01 Differential diagnosis: superficial laceration, Tetanus booster. Data reviewed: vital   ms3 

      signs, nurses notes. Data interpreted: Pulse oximetry: on room air is 98 %.                 

      Interpretation: normal. Counseling: I had a detailed discussion with the patient and/or     

      guardian regarding: the historical points, exam findings, and any diagnostic results        

      supporting the discharge/admit diagnosis, the need for outpatient follow up, to return      

      to the emergency department if symptoms worsen or persist or if there are any questions     

      or concerns that arise at home. ED course: Discussed physical exam findings with            

      patient. Patient to follow-up with his primary care physician as discussed. All             

      questions were answered. Return precautions discussed include worsening symptoms, or        

      any other concerns.                                                                         

                                                                                                  

Administered Medications:                                                                         

21:14 Drug: ADAcel 0.5 ml {: [employer]. Exp: 2024. Lot #: [id number]. }      lp1 

      Route: IM; Site: left deltoid;                                                              

21:15 Follow up: Response: Medication administered at discharge.                              lp1 

                                                                                                  

                                                                                                  

Disposition Summary:                                                                              

22 20:48                                                                                    

Discharge Ordered                                                                                 

      Location: Home                                                                          ms3 

      Problem: new                                                                            ms3 

      Symptoms: are unchanged                                                                 ms3 

      Condition: Stable                                                                       ms3 

      Diagnosis                                                                                   

        - Abrasion of left forearm                                                            ms3 

      Followup:                                                                               ms3 

        - With: Ronnell Barbosa MD                                                                 

        - When: 2 - 3 days                                                                         

        - Reason: Re-evaluation by your physician                                                  

      Discharge Instructions:                                                                     

        - Discharge Summary Sheet                                                             ms3 

        - Abrasion                                                                            ms3 

      Forms:                                                                                      

        - Medication Reconciliation Form                                                      ms3 

        - Thank You Letter                                                                    ms3 

        - Antibiotic Education                                                                ms3 

        - Prescription Opioid Use                                                             ms3 

Signatures:                                                                                       

Alicia May RN                         RN   lp1                                                  

Nikhil Diaz DO                        DO   ms3                                                  

Fortino Diaz                           ab2                                                  

                                                                                                  

Corrections: (The following items were deleted from the chart)                                    

21:47 21:47 PMHx: seasonal allergies; lp1                                                     lp1 

                                                                                                  

**************************************************************************************************

## 2022-04-09 NOTE — ER
Nurse's Notes                                                                                     

 [facility]                                                                 

Name: Eloy Saul                                                                               

Age: 21 yrs                                                                                       

Sex: Male                                                                                         

: 2000                                                                                   

MRN: [id number]                                                                                   

Arrival Date: 2022                                                                          

Time: 20:16                                                                                       

Account#: [id number]                                                                            

Bed 11                                                                                            

Private MD:                                                                                       

Diagnosis: Abrasion of left forearm                                                               

                                                                                                  

Presentation:                                                                                     

                                                                                             

20:28 Chief complaint: Patient states: "I was at work and accidentally cut my arm on a piece  ab2 

      of [employer]. I don't know when my last tetanus shot was but I am freaking out that       

      I'll get tetanus. I did put liquid Band-Aid on it." Pt has superficial laceration left      

      forearm. Pt denies any pain. Coronavirus screen: Vaccine status: Patient reports            

      receiving the 2nd dose of the covid vaccine. Client denies travel out of the [place] in        

      the last 14 days. At this time, the client does not indicate any symptoms associated        

      with coronavirus-19. Ebola Screen: Patient negative for fever greater than or equal to      

      101.5 degrees Fahrenheit, and additional compatible Ebola Virus Disease symptoms            

      Patient denies exposure to infectious person. Patient denies travel to an                   

      Ebola-affected area in the 21 days before illness onset. No symptoms or risks               

      identified at this time. Complicating Factors: There are no complicating factors for        

      this patient. Initial Sepsis Screen: Does the patient meet any 2 criteria? No.              

      Patient's initial sepsis screen is negative. Does the patient have a suspected source       

      of infection? No. Patient's initial sepsis screen is negative. Risk Assessment: Do you      

      want to hurt yourself or someone else? Patient reports no desire to harm self or            

      others. Onset of symptoms is unknown.                                                       

20:28 Method Of Arrival: Ambulatory                                                           ab2 

20:28 Acuity: ALBERTO 4                                                                           ab2 

                                                                                                  

Triage Assessment:                                                                                

20:32 General: Appears in no apparent distress. comfortable, Behavior is cooperative,         ab2 

      appropriate for age, anxious. Pain: Denies pain. Neuro: No deficits noted. Level of         

      Consciousness is awake, alert, obeys commands, Oriented to person, place, time,             

      situation, Appropriate for age  are equal bilaterally. Cardiovascular: No deficits     

      noted. Denies chest pain, shortness of breath. Respiratory: Airway is patent                

      Respiratory effort is even, unlabored, Respiratory pattern is regular, symmetrical. GI:     

      No deficits noted. No signs and/or symptoms were reported involving the                     

      gastrointestinal system. : No deficits noted. No signs and/or symptoms were reported      

      regarding the genitourinary system. Derm: Wound noted dorsal aspect of left forearm.        

      Injury Description: Laceration sustained to dorsal aspect of left forearm is bleeding       

      no active bleeding noted.                                                                   

                                                                                                  

Historical:                                                                                       

- Allergies:                                                                                      

20:32 No Known Allergies;                                                                     ab2 

- PMHx:                                                                                           

20:32 Asthma; Migraines;                                                                      ab2 

                                                                                                  

- Immunization history:: Adult Immunizations unknown.                                             

- Social history:: Smoking status: Reported history of juuling and/or vaping.                     

                                                                                                  

                                                                                                  

Screenin:00 Abuse screen: Denies threats or abuse. Denies injuries from another. Nutritional        lp1 

      screening: No deficits noted. Tuberculosis screening: No symptoms or risk factors           

      identified. Fall Risk None identified.                                                      

                                                                                                  

Assessment:                                                                                       

21:00 General: Appears in no apparent distress. Behavior is calm, cooperative, appropriate    lp1 

      for age. Pain: Denies pain. Neuro: No deficits noted. Cardiovascular: Patient's skin is     

      warm and dry. Respiratory: Respiratory effort is even, unlabored. Derm: Skin is pink,       

      warm \T\ dry. Wound noted Wound is Multiple superficial abrasions to left forearm, no       

      active bleeding.                                                                            

21:00 Musculoskeletal: Circulation, motion, and sensation intact.                             lp1 

                                                                                                  

Vital Signs:                                                                                      

20:28  / 71; Pulse 106; Resp 16; Temp 97.9(TE); Pulse Ox 98% on R/A; Weight 65.77 kg;   ab2 

      Height 5 ft. 5 in. (165.10 cm); Pain 0/10;                                                  

20:28 Body Mass Index 24.13 (65.77 kg, 165.10 cm)                                             ab2 

                                                                                                  

ED Course:                                                                                        

20:16 Patient arrived in ED.                                                                  kz  

20:26 Nikhil Diaz DO is Attending Physician.                                                ms3 

20:32 Triage completed.                                                                       ab2 

20:33 Arm band placed on right wrist.                                                         ab2 

20:47 Ronnell Barbosa MD is Referral Physician.                                               ms3 

21:00 Patient has correct armband on for positive identification.                             lp1 

21:00 No provider procedures requiring assistance completed. Patient did not have IV access   lp1 

      during this emergency room visit.                                                           

21:00 Wound care: to abrasion, located on dorsal aspect of left forearm was cleaned with      lp1 

      Hibiclens.                                                                                  

                                                                                                  

Administered Medications:                                                                         

21:14 Drug: ADAcel 0.5 ml {: [employer]. Exp: 2024. Lot #: [id number]. }      lp1 

      Route: IM; Site: left deltoid;                                                              

21:15 Follow up: Response: Medication administered at discharge.                              lp1 

                                                                                                  

                                                                                                  

Outcome:                                                                                          

20:48 Discharge ordered by MD.                                                                ms3 

21:15 Discharged to home ambulatory.                                                          lp1 

21:15 Condition: good                                                                             

21:15 Discharge instructions given to patient, Instructed on discharge instructions, follow       

      up and referral plans. Demonstrated understanding of instructions, follow-up care.          

21:15 Patient left the ED.                                                                    lp1 

                                                                                                  

Signatures:                                                                                       

Alicia May RN                         RN   lp1                                                  

Nikhil Diaz DO DO   ms3                                                  

Fortino Diaz Kelly kz                                                   

                                                                                                  

Corrections: (The following items were deleted from the chart)                                    

21:47 21:47 PMHx: seasonal allergies; lp1                                                     lp1 

                                                                                                  

**************************************************************************************************

## 2022-04-12 ENCOUNTER — HOSPITAL ENCOUNTER (EMERGENCY)
Dept: HOSPITAL 97 - ER | Age: 22
Discharge: HOME | End: 2022-04-12
Payer: COMMERCIAL

## 2022-04-12 DIAGNOSIS — M54.6: ICD-10-CM

## 2022-04-12 DIAGNOSIS — S29.012A: Primary | ICD-10-CM

## 2022-04-12 PROCEDURE — 96372 THER/PROPH/DIAG INJ SC/IM: CPT

## 2022-04-12 PROCEDURE — 99284 EMERGENCY DEPT VISIT MOD MDM: CPT

## 2022-04-12 PROCEDURE — 71046 X-RAY EXAM CHEST 2 VIEWS: CPT

## 2022-04-12 NOTE — ER
Nurse's Notes                                                                                     

 [facility]                                                                 

Name: Eloy Saul                                                                               

Age: 21 yrs                                                                                       

Sex: Male                                                                                         

: 2000                                                                                   

MRN: [id number]                                                                                   

Arrival Date: 2022                                                                          

Time: 17:36                                                                                       

Account#: [id number]                                                                            

Bed 9                                                                                             

Private MD:                                                                                       

Diagnosis: Strain of muscle and tendon of back wall of thorax                                     

                                                                                                  

Presentation:                                                                                     

                                                                                             

18:25 Chief complaint: Patient states: back pain when breathing that began 2 weeks ago. Pt    ss  

      reports this happens once a month, lasts a week and usually goes away. Has been told in     

      the past by his PCP that it may be pleurisy. Coronavirus screen: Client denies travel       

      out of the [place] in the last 14 days. Ebola Screen: Patient denies exposure to               

      infectious person. Patient denies travel to an Ebola-affected area in the 21 days           

      before illness onset. Initial Sepsis Screen: Does the patient meet any 2 criteria? No.      

      Patient's initial sepsis screen is negative. Does the patient have a suspected source       

      of infection? No. Patient's initial sepsis screen is negative. Risk Assessment: Do you      

      want to hurt yourself or someone else? Patient reports no desire to harm self or            

      others. Note PT reports he took a Tramadol and "some kind of muscle relaxer" around 1pm     

      today. States that it did help some. Onset of symptoms was 2022.                  

18:25 Method Of Arrival: Ambulatory                                                           ss  

18:25 Acuity: ALBERTO 4                                                                           ss  

                                                                                                  

Triage Assessment:                                                                                

20:23 General: Behavior is calm, cooperative.                                                 ab2 

                                                                                                  

Historical:                                                                                       

- Allergies:                                                                                      

18:28 No Known Allergies;                                                                     ss  

- Home Meds:                                                                                      

18:28 None [Active];                                                                          ss  

- PMHx:                                                                                           

18:28 Asthma; Migraines;                                                                      ss  

- PSHx:                                                                                           

18:28 None;                                                                                   ss  

                                                                                                  

- Immunization history:: Client reports receiving the 2nd dose of the Covid vaccine.              

- Social history:: Smoking status: Reported history of juuling and/or vaping.                     

                                                                                                  

                                                                                                  

Screenin:23 Abuse screen: Denies threats or abuse. Denies injuries from another. Nutritional        ab2 

      screening: No deficits noted. Tuberculosis screening: No symptoms or risk factors           

      identified. Fall Risk None identified.                                                      

                                                                                                  

Assessment:                                                                                       

20:00 General: Appears in no apparent distress. comfortable. Neuro: Level of Consciousness is al4 

      awake, alert, obeys commands, Oriented to person, place, time, situation.                   

      Cardiovascular: Patient's skin is warm and dry. Respiratory: Airway is patent               

      Respiratory effort is unlabored.                                                            

20:23 General: Appears in no apparent distress. comfortable. Pain: Complains of pain in back. ab2 

      Neuro: Level of Consciousness is awake, alert, obeys commands, Oriented to person,          

      place, time, situation, Appropriate for age  are equal bilaterally Moves all           

      extremities. Gait is steady, Speech is normal. Cardiovascular: Heart tones S1 S2            

      present Patient's skin is warm and dry. Rhythm is sinus rhythm. Respiratory: Airway is      

      patent Respiratory effort is even, unlabored, Breath sounds are clear bilaterally. GI:      

      No deficits noted. No signs and/or symptoms were reported involving the                     

      gastrointestinal system. : No deficits noted. No signs and/or symptoms were reported      

      regarding the genitourinary system. Derm: Skin is intact, is healthy with good turgor,      

      Skin is pink, warm \T\ dry.                                                                 

                                                                                                  

Vital Signs:                                                                                      

18:25  / 65; Pulse 87; Resp 16; Temp 98.8(TE); Pulse Ox 99% on R/A; Weight 65.77 kg;    ss  

      Height 5 ft. 5 in. (165.10 cm); Pain 8/10;                                                  

20:22  / 71; Pulse 81; Resp 17; Pulse Ox 100% on R/A;                                   ab2 

18:25 Body Mass Index 24.13 (65.77 kg, 165.10 cm)                                               

                                                                                                  

ED Course:                                                                                        

17:36 Patient arrived in ED.                                                                  mr  

18:28 Triage completed.                                                                       ss  

18:28 Arm band placed on right wrist.                                                         ss  

18:32 Luís Jameson NP is PHCP.                                                           pm1 

18:32 Adolph Yusuf MD is Attending Physician.                                             pm1 

18:55 XRAY Chest Pa And Lat (2 Views) In Process Unspecified.                                 EDMS

19:50 Fortino Eaton is Primary Nurse.                                                     al4 

20:23 No provider procedures requiring assistance completed.                                  ab2 

20:24 Patient has correct armband on for positive identification. Bed in low position. Call   ab2 

      light in reach.                                                                             

20:24 Patient did not have IV access during this emergency room visit.                        ab2 

                                                                                                  

Administered Medications:                                                                         

20:20 Drug: Lidoderm Patch 5 % (700 mg/patch) 1 patches Route: Topical; Site: affected area;  ab2 

20:20 Drug: Ketorolac 60 mg Route: IM; Site: left vastus lateralis;                           ab2 

                                                                                                  

                                                                                                  

Outcome:                                                                                          

20:00 Discharge ordered by MD.                                                                pm1 

20:23 Discharged to home ambulatory.                                                          ab2 

20:23 Condition: good                                                                             

20:23 Discharge instructions given to patient, Instructed on discharge instructions, follow       

      up and referral plans. medication usage, Demonstrated understanding of instructions,        

      follow-up care, medications, Prescriptions given X 3.                                       

20:24 Patient left the ED.                                                                    ab2 

                                                                                                  

Signatures:                                                                                       

Dispatcher [employer]                           EDMS                                                 

Annemarie Levi Shelby, RN                      RN   ss                                                   

Luís Jameson, NP                    NP   pm1                                                  

Fortino Eaton                            alFortino Jones                           ab2                                                  

                                                                                                  

**************************************************************************************************

## 2022-04-12 NOTE — RAD REPORT
EXAM DESCRIPTION:  RAD - Chest Pa And Lat (2 Views) - 4/12/2022 6:53 pm

 

CLINICAL HISTORY:  CHEST PAIN

 

COMPARISON:  Portable 10/16/2021

 

TECHNIQUE:  Frontal and lateral views of the chest were obtained.

 

FINDINGS:  The lungs are clear.   Heart size is normal and central vasculature is within normal limit
s.  No pleural effusion or pneumothorax seen.  No acute bony finding noted.  No aortic abnormality.

 

IMPRESSION:  No acute cardiopulmonary process.

## 2022-04-12 NOTE — EDPHYS
Physician Documentation                                                                           

 [facility]                                                                 

Name: Eloy Saul                                                                               

Age: 21 yrs                                                                                       

Sex: Male                                                                                         

: 2000                                                                                   

MRN: [id number]                                                                                   

Arrival Date: 2022                                                                          

Time: 17:36                                                                                       

Account#: [id number]                                                                            

Bed 9                                                                                             

Private MD:                                                                                       

ED Physician Adolph Yusuf                                                                      

HPI:                                                                                              

                                                                                             

18:32 This 21 yrs old Male presents to ER via Ambulatory with complaints of Breathing         pm1 

      Difficulty.                                                                                 

18:32 The patient has shortness of breath at rest. Onset: The symptoms/episode began/occurred pm1 

      2 week(s) ago. Duration: The symptoms are continuous. The patient's shortness of breath     

      is aggravated by movement, deep breathing, is alleviated by nothing, patient took           

      tramadol prior to arrival for the pain. Associated signs and symptoms: Pertinent            

      negatives: chest pain, fever, nausea, vomiting, Cough. Severity of symptoms: in the         

      emergency department the symptoms are unchanged usually it only lasts for 1 week but he     

      started working out once it improved and it returned. The patient has not experienced       

      similar symptoms in the past. The patient has not recently seen a physician.                

                                                                                                  

Historical:                                                                                       

- Allergies:                                                                                      

18:28 No Known Allergies;                                                                     ss  

- Home Meds:                                                                                      

18:28 None [Active];                                                                          ss  

- PMHx:                                                                                           

18:28 Asthma; Migraines;                                                                      ss  

- PSHx:                                                                                           

18:28 None;                                                                                   ss  

                                                                                                  

- Immunization history:: Client reports receiving the 2nd dose of the Covid vaccine.              

- Social history:: Smoking status: Reported history of juuling and/or vaping.                     

                                                                                                  

                                                                                                  

ROS:                                                                                              

18:32 Constitutional: Negative for fever, chills, and weight loss, Cardiovascular: Negative   pm1 

      for chest pain, palpitations, and edema, Respiratory: Negative for shortness of breath,     

      cough, wheezing, and pleuritic chest pain, Abdomen/GI: Negative for abdominal pain,         

      nausea, vomiting, diarrhea, and constipation.                                               

18:32 : Negative for injury, bleeding, discharge, and swelling, MS/Extremity: Negative for      

      injury and deformity, Skin: Negative for injury, rash, and discoloration.                   

18:32 Back: Positive for Pain in the thoracic area.                                               

18:32 All other systems are negative.                                                             

                                                                                                  

Exam:                                                                                             

18:32 Constitutional:  This is a well developed, well nourished patient who is awake, alert,  pm1 

      and in no acute distress.                                                                   

                                                                                                  

Vital Signs:                                                                                      

18:25  / 65; Pulse 87; Resp 16; Temp 98.8(TE); Pulse Ox 99% on R/A; Weight 65.77 kg;    ss  

      Height 5 ft. 5 in. (165.10 cm); Pain 8/10;                                                  

20:22  / 71; Pulse 81; Resp 17; Pulse Ox 100% on R/A;                                   ab2 

18:25 Body Mass Index 24.13 (65.77 kg, 165.10 cm)                                             ss  

                                                                                                  

MDM:                                                                                              

18:44 Patient medically screened.                                                             pm1 

19:59 Data reviewed: vital signs. Data interpreted: Pulse oximetry: on room air is 99 %.      pm1 

      Interpretation: normal. Counseling: I had a detailed discussion with the patient and/or     

      guardian regarding: the historical points, exam findings, and any diagnostic results        

      supporting the discharge/admit diagnosis, radiology results, the need for outpatient        

      follow up, to return to the emergency department if symptoms worsen or persist or if        

      there are any questions or concerns that arise at home.                                     

                                                                                                  

                                                                                             

18:29 Order name: XRAY Chest Pa And Lat (2 Views); Complete Time: 19:57                       ss  

                                                                                                  

Administered Medications:                                                                         

20:20 Drug: Lidoderm Patch 5 % (700 mg/patch) 1 patches Route: Topical; Site: affected area;  ab2 

20:20 Drug: Ketorolac 60 mg Route: IM; Site: left vastus lateralis;                           ab2 

                                                                                                  

                                                                                                  

Disposition Summary:                                                                              

22 20:00                                                                                    

Discharge Ordered                                                                                 

      Location: Home                                                                          pm1 

      Problem: new                                                                            pm1 

      Symptoms: have improved                                                                 pm1 

      Condition: Stable                                                                       pm1 

      Diagnosis                                                                                   

        - Strain of muscle and tendon of back wall of thorax                                  pm1 

      Followup:                                                                               pm1 

        - With: Emergency Department                                                               

        - When: As needed                                                                          

        - Reason: Worsening of condition                                                           

      Followup:                                                                               pm1 

        - With: Private Physician                                                                  

        - When: 2 - 3 days                                                                         

        - Reason: Recheck today's complaints, Continuance of care, Re-evaluation by your           

      physician                                                                                   

      Discharge Instructions:                                                                     

        - Discharge Summary Sheet                                                             pm1 

        - Muscle Strain                                                                       pm1 

      Forms:                                                                                      

        - Medication Reconciliation Form                                                      pm1 

        - Thank You Letter                                                                    pm1 

        - Antibiotic Education                                                                pm1 

        - Prescription Opioid Use                                                             pm1 

      Prescriptions:                                                                              

        - Lidoderm 5 % Topical adhesive patch,medicated                                            

            - apply 1 patch by TRANSDERMAL route once daily As needed 12 hours on and 12      pm1 

      hours off in a 24 hour period; 10 patch; Refills: 0, Product Selection Permitted            

        - Cyclobenzaprine 10 mg Oral Tablet                                                        

            - take 1 tablet by ORAL route every 8 hours As needed; 30 tablet; Refills: 0,     pm1 

      Product Selection Permitted                                                                 

        - Diclofenac Sodium 75 mg Oral tablet,delayed release (DR/EC)                              

            - take 1 tablet by ORAL route 2 times per day As needed; 30 tablet; Refills: 0,   pm1 

      Product Selection Permitted                                                                 

Addendum:                                                                                         

2022                                                                                        

     18:44 Co-signature as Attending Physician, Adolph Yusuf MD I agree with the assessment and  c
ha

           plan of care.                                                                          

                                                                                                  

Signatures:                                                                                       

Dispatcher [employer]                           [facility]                                                 

Adolph Yusuf MD MD cha Smirch, Shelby, RN                      RN   ss                                                   

Luís Jameson, NP                    NP   pm1                                                  

Fortino Diaz                                                  

                                                                                                  

**************************************************************************************************

## 2022-04-13 VITALS — SYSTOLIC BLOOD PRESSURE: 117 MMHG | OXYGEN SATURATION: 100 % | DIASTOLIC BLOOD PRESSURE: 71 MMHG

## 2022-04-13 VITALS — TEMPERATURE: 98.8 F

## 2024-12-02 ENCOUNTER — HOSPITAL ENCOUNTER (EMERGENCY)
Dept: HOSPITAL 97 - ER | Age: 24
Discharge: HOME | End: 2024-12-02
Payer: COMMERCIAL

## 2024-12-02 VITALS — SYSTOLIC BLOOD PRESSURE: 126 MMHG | OXYGEN SATURATION: 98 % | DIASTOLIC BLOOD PRESSURE: 78 MMHG

## 2024-12-02 VITALS — TEMPERATURE: 97.2 F

## 2024-12-02 DIAGNOSIS — N50.82: Primary | ICD-10-CM

## 2024-12-02 LAB
UA COMPLETE W REFLEX CULTURE PNL UR: (no result)
UA COMPLETE W REFLEX CULTURE PNL UR: (no result)

## 2024-12-02 PROCEDURE — 99283 EMERGENCY DEPT VISIT LOW MDM: CPT

## 2024-12-02 PROCEDURE — 76870 US EXAM SCROTUM: CPT

## 2024-12-02 PROCEDURE — 81001 URINALYSIS AUTO W/SCOPE: CPT

## 2024-12-02 NOTE — RAD REPORT
EXAM:   US Scrotum



CLINICAL HISTORY:   The patient is 23 years old and is Male; PAIN



TECHNIQUE:   Real-time ultrasound of the scrotum with color Doppler and image documentation.



COMPARISON:   No relevant prior studies available.



FINDINGS:



  Right testicle:   Unremarkable.   No mass.   No torsion.



  Left testicle:   Unremarkable.   No mass.   No torsion.



  Epididymides:   Unremarkable.



  Scrotum:   Trace right hydrocele.



IMPRESSION:      



  No acute findings in the scrotum.



Electronically signed by:   Ronnell Kincaid MD   12/02/2024 05:52 AM [facility] Workstation: [id number]
8



Due to temporary technical issues with the PACS/Power scribe reporting system, reports are being sign
ed by 

the in-house radiologist without review as a courtesy to ensure prompt reporting the interpreting rad
iologist is fully 

responsible for the content of the report. 



Transcribed Date/Time: 12/2/2024 7:17 AM



Reported By: Jefe Beaulieu 

Electronically Signed:  12/2/2024 12:26 PM

## 2024-12-02 NOTE — EDPHYS
Physician Documentation                                                                           

 [facility]                                                                 

Name: Eloy Saul                                                                               

Age: 23 yrs                                                                                       

Sex: Male                                                                                         

: 2000                                                                                   

MRN: [id number]                                                                                   

Arrival Date: 2024                                                                          

Time: 04:14                                                                                       

Account#: [id number]                                                                            

Bed 8                                                                                             

Private MD:                                                                                       

ED Physician Brigitte Zuniga                                                                         

HPI:                                                                                              

                                                                                             

04:56 This 23 yrs old Male presents to ER via Ambulatory with complaints of Testicular Pain.  sp3 

04:56 23-year-old male history of migraines and asthma presents with left-sided testicular    sp3 

      pain which she has had before. He denies any urethral discharge, fever, prior torsion,      

      or any other history. He says he might of had some trauma to it while moving. He denies     

      any fever, chest pain, shortness breath, abdominal pain, vomiting, diarrhea, rash, or       

      any other signs or symptoms on ROS at this time..                                           

                                                                                                  

Historical:                                                                                       

- Allergies:                                                                                      

04:30 No Known Allergies;                                                                     lg3 

- Home Meds:                                                                                      

04:30 None [Active];                                                                          lg3 

- PMHx:                                                                                           

04:30 Asthma; Migraines;                                                                      lg3 

- PSHx:                                                                                           

04:30 None;                                                                                   lg3 

                                                                                                  

- Immunization history:: Adult Immunizations up to date.                                          

- Infectious Disease History:: Denies.                                                            

- Social history:: Smoking status: Reported history of juuling and/or vaping. Patient             

  uses alcohol, occasionally. street drugs, marijuana.                                            

                                                                                                  

                                                                                                  

ROS:                                                                                              

04:57 Constitutional: Negative for fever, chills, and weight loss, Eyes: Negative for injury, sp3 

      pain, redness, and discharge, ENT: Negative for injury, pain, and discharge, Neck:          

      Negative for injury, pain, and swelling, Cardiovascular: Negative for chest pain,           

      palpitations, and edema, Respiratory: Negative for shortness of breath, cough,              

      wheezing, and pleuritic chest pain, Abdomen/GI: Negative for abdominal pain, nausea,        

      vomiting, diarrhea, and constipation, Back: Negative for injury and pain, MS/Extremity:     

      Negative for injury and deformity, Skin: Negative for injury, rash, and discoloration,      

      Neuro: Negative for headache, weakness, numbness, tingling, and seizure, Psych:             

      Negative for depression, anxiety, suicide ideation, homicidal ideation, and                 

      hallucinations, Allergy/Immunology: Negative for hives, rash, and allergies, Endocrine:     

      Negative for neck swelling, polydipsia, polyuria, polyphagia, and marked weight             

      changes, Hematologic/Lymphatic: Negative for swollen nodes, abnormal bleeding, and          

      unusual bruising,                                                                           

04:57 All other systems are negative,                                                             

                                                                                                  

Exam:                                                                                             

04:57 Constitutional:  This is a well developed, well nourished patient who is awake, alert,  sp3 

      and in no acute distress. Head/Face:  Normocephalic, atraumatic. Eyes:  Pupils equal        

      round and reactive to light, extra-ocular motions intact.  Lids and lashes normal.          

      Conjunctiva and sclera are non-icteric and not injected.  Cornea within normal limits.      

      Periorbital areas with no swelling, redness, or edema. Neck:  Trachea midline, no           

      thyromegaly or masses palpated, and no cervical lymphadenopathy.  Supple, full range of     

      motion without nuchal rigidity, or vertebral point tenderness.  No Meningismus.             

      Chest/axilla:  Normal chest wall appearance and motion.  Nontender with no deformity.       

      No lesions are appreciated. Cardiovascular:  Regular rate and rhythm with a normal S1       

      and S2.  No gallops, murmurs, or rubs.  Normal PMI, no JVD.  No pulse deficits.             

      Respiratory:  Lungs have equal breath sounds bilaterally, clear to auscultation and         

      percussion.  No rales, rhonchi or wheezes noted.  No increased work of breathing, no        

      retractions or nasal flaring. Abdomen/GI:  Soft, non-tender, with normal bowel sounds.      

      No distension or tympany.  No guarding or rebound.  No evidence of tenderness               

      throughout. Back:  No spinal tenderness.  No costovertebral tenderness.  Full range of      

      motion. Skin:  Warm, dry with normal turgor.  Normal color with no rashes, no lesions,      

      and no evidence of cellulitis. MS/ Extremity:  Pulses equal, no cyanosis.                   

      Neurovascular intact.  Full, normal range of motion. Neuro:  Awake and alert, GCS 15,       

      oriented to person, place, time, and situation.  Cranial nerves II-XII grossly intact.      

      Motor strength 5/5 in all extremities.  Sensory grossly intact.  Cerebellar exam            

      normal.  Normal gait. Psych:  Awake, alert, with orientation to person, place and time.     

       Behavior, mood, and affect are within normal limits.                                       

04:57 : Left testicular pain at the base of the scrotum.  No epididymal tenderness.,            

                                                                                                  

Vital Signs:                                                                                      

04:28  / 90; Pulse 98; Resp 17 S; Temp 97.2(O); Pulse Ox 100% on R/A; Weight 72.57 kg   lg3 

      (R); Height 5 ft. 5 in. ; Pain 2/10;                                                        

04:30  / 78 (/pedi); Pulse 66; Resp 16 S; Pulse Ox 98% on R/A;                          mt4 

04:28 Body Mass Index 26.63 (72.57 kg, 165.1 cm)                                              lg3 

04:28 Pain Scale: Adult                                                                       lg3 

                                                                                                  

Rae Coma Score:                                                                               

04:38 Eye Response: spontaneous(4). Motor Response: obeys commands(6). Verbal Response:       mt4 

      oriented(5). Total: 15.                                                                     

                                                                                                  

MDM:                                                                                              

04:23 Medical Screening Exam initiated                                                        sp3 

04:58 Data reviewed: vital signs, nurses notes. ED course: Differential diagnosis includes    sp3 

      testicular torsion, epididymitis, UTI, other infection, local trauma. Ultrasound is         

      negative of the testicle and UA demonstrates no infection or other abnormality. We will     

      safely discharge patient home with instructions for scrotal support and follow-up with      

      PCP..                                                                                       

                                                                                                  

                                                                                             

04:27 Order name: FELIPA; Complete Time: 04:55                                                   sp3 

                                                                                             

04:27 Order name: US Scrotum Testicles                                                        sp3 

                                                                                                  

Administered Medications:                                                                         

No medications were administered                                                                  

                                                                                                  

                                                                                                  

Disposition Summary:                                                                              

24 04:59                                                                                    

Discharge Ordered                                                                                 

 Notes:       Location: Home                                                                        
  sp3

      Condition: Stable                                                                       sp3 

      Diagnosis                                                                                   

        - Testicular pain, scrotal pain left side                                             sp3 

      Followup:                                                                               sp3 

        - With: Private Physician                                                                  

        - When: Upon discharge from the Emergency Department                                       

        - Reason: Continuance of care                                                              

      Discharge Instructions:                                                                     

        - Discharge Summary Sheet                                                             sp3 

        - Scrotal Swelling                                                                    sp3 

      Forms:                                                                                      

        - Medication Reconciliation Form                                                      sp3 

        - Antibiotic Education                                                                sp3 

        - Prescription Opioid Use                                                             sp3 

        - Patient Portal Instructions                                                         sp3 

        - Leadership Thank You Letter                                                         sp3 

Signatures:                                                                                       

Dispatcher [employer]                           Nelly Benavides RN                         RN   lg3                                                  

Brigitte Zuniga MD MD   sp3                                                  

                                                                                                  

**************************************************************************************************

## 2024-12-02 NOTE — ER
Nurse's Notes                                                                                     

 [facility]                                                                 

Name: Eloy Saul                                                                               

Age: 23 yrs                                                                                       

Sex: Male                                                                                         

: 2000                                                                                   

MRN: [id number]                                                                                   

Arrival Date: 2024                                                                          

Time: 04:14                                                                                       

Account#: [id number]                                                                            

Bed 8                                                                                             

Private MD:                                                                                       

Diagnosis: Testicular pain, scrotal pain left side                                                

                                                                                                  

Presentation:                                                                                     

                                                                                             

04:28 Chief complaint: Patient states: left sided testicular pain X1 hr. denies injury.       lg3 

      reports testicle feels sideways. Coronavirus screen: Client denies travel out of the        

      [place] in the last 14 days. At this time, the client does not indicate any symptoms           

      associated with coronavirus-19. Ebola Screen: No symptoms or risks identified at this       

      time. Initial Sepsis Screen: Does the patient meet any 2 criteria? No. Patient's            

      initial sepsis screen is negative. Does the patient have a suspected source of              

      infection? No. Patient's initial sepsis screen is negative. Risk Assessment: Do you         

      want to hurt yourself or someone else? Patient reports no desire to harm self or            

      others. Onset of symptoms was 2024.                                            

04:28 Method Of Arrival: Ambulatory                                                           lg3 

04:28 Acuity: ALBERTO 3                                                                           lg3 

                                                                                                  

Triage Assessment:                                                                                

04:30 General: Appears in no apparent distress. uncomfortable, Behavior is calm, cooperative. lg3 

      Pain: Complains of pain in left testicle Pain does not radiate. Pain currently is 2 out     

      of 10 on a pain scale. Noted to be resistant to movement. EENT: No deficits noted. No       

      signs and/or symptoms were reported regarding the EENT system. Neuro: No deficits           

      noted. [place] Agitation-Sedation Scale (RASS): 0 - Alert and Calm Level of                

      Consciousness is awake, alert, obeys commands, Oriented to person, place, time,             

      situation. Cardiovascular: No deficits noted. Denies chest pain, shortness of breath,       

      Capillary refill < 3 seconds Clubbing of nail beds is absent JVD is absent Patient's        

      skin is warm and dry. Respiratory: No deficits noted. Airway is patent Respiratory          

      effort is even, unlabored, Respiratory pattern is regular, symmetrical. GI: No signs        

      and/or symptoms were reported involving the gastrointestinal system. : Reports            

      Scrotal pain: sudden onset. Derm: No deficits noted. No signs and/or symptoms reported      

      regarding the dermatologic system. Skin is intact, is healthy with good turgor, Skin is     

      dry, Skin is normal, Skin temperature is warm. Musculoskeletal: No deficits noted. No       

      signs and/or symptoms reported regarding the musculoskeletal system. Circulation,           

      motion, and sensation intact. Range of motion: intact in all extremities.                   

                                                                                                  

Historical:                                                                                       

- Allergies:                                                                                      

04:30 No Known Allergies;                                                                     lg3 

- Home Meds:                                                                                      

04:30 None [Active];                                                                          lg3 

- PMHx:                                                                                           

04:30 Asthma; Migraines;                                                                      lg3 

- PSHx:                                                                                           

04:30 None;                                                                                   lg3 

                                                                                                  

- Immunization history:: Adult Immunizations up to date.                                          

- Infectious Disease History:: Denies.                                                            

- Social history:: Smoking status: Reported history of juuling and/or vaping. Patient             

  uses alcohol, occasionally. street drugs, marijuana.                                            

                                                                                                  

                                                                                                  

Screenin:38 [facility] ED Fall Risk Assessment (Adult) History of falling in the last 3 months,       mt4 

      including since admission No falls in past 3 months (0 pts) Confusion or Disorientation     

      No (0 pts) Intoxicated or Sedated No (0 pts) Impaired Gait No (0 pts) Mobility Assist       

      Device Used No (0 pt) Altered Elimination No (0 pt) Score/Fall Risk Level 0 - 2 = Low       

      Risk. Abuse screen: Denies threats or abuse. Nutritional screening: No deficits noted.      

      Tuberculosis screening: No symptoms or risk factors identified. Exposure risk/Travel        

      Screening: None identified.                                                                 

                                                                                                  

Assessment:                                                                                       

04:35 General: Appears in no apparent distress. comfortable, Behavior is calm, cooperative,   mt4 

      appropriate for age. Neuro: Level of Consciousness is awake, alert, obeys commands.         

      Cardiovascular: Capillary refill < 3 seconds. Respiratory: Airway is patent Respiratory     

      effort is even, unlabored, Respiratory pattern is regular, symmetrical. GI: Abdomen is      

      non-distended. GI: Abd is soft and non tender X 4 quads. :.                               

04:38 Pain: Complains of pain in pelvis Pain began 1 hour ago. Neuro:.                        mt4 

04:38 : Reports Scrotal pain: sudden onset Denies burning with urination. Musculoskeletal:  mt4 

      Capillary refill < 3 seconds, Range of motion: intact in all extremities.                   

                                                                                                  

Vital Signs:                                                                                      

04:28  / 90; Pulse 98; Resp 17 S; Temp 97.2(O); Pulse Ox 100% on R/A; Weight 72.57 kg   lg3 

      (R); Height 5 ft. 5 in. ; Pain 2/10;                                                        

04:30  / 78 (/pedi); Pulse 66; Resp 16 S; Pulse Ox 98% on R/A;                          mt4 

04:28 Body Mass Index 26.63 (72.57 kg, 165.1 cm)                                              lg3 

04:28 Pain Scale: Adult                                                                       lg3 

                                                                                                  

[place] Coma Score:                                                                               

04:38 Eye Response: spontaneous(4). Motor Response: obeys commands(6). Verbal Response:       mt4 

      oriented(5). Total: 15.                                                                     

                                                                                                  

ED Course:                                                                                        

04:16 Patient arrived in ED.                                                                  jj6 

04:23 Brigitte Zuniga MD is Attending Physician.                                                sp3 

04:29 Navin Moore, RN is Primary Nurse.                                                     mt4 

04:30 Triage completed.                                                                       lg3 

04:30 Arm band placed on right wrist.                                                         lg3 

04:38 Resting quietly.                                                                        mt4 

04:38 Patient has correct armband on for positive identification. Bed in low position. Call   mt4 

      light in reach. Side rails up X 1. Provided Education on: labs. Client placed on            

      continuous cardiac and pulse oximetry monitoring. NIBP monitoring applied. Door closed.     

      Lights dimmed. Warm blanket given. Pillow given. Verbal reassurance given. Assisted to      

      bathroom.                                                                                   

04:38 No provider procedures requiring assistance completed. Patient did not have IV access   mt4 

      during this emergency room visit. Patient maintains SpO2 saturation greater than 95% on     

      room air.                                                                                   

05:00  Scrotum Testicles In Process Unspecified.                                            EDMS

                                                                                                  

Administered Medications:                                                                         

No medications were administered                                                                  

                                                                                                  

                                                                                                  

Medication:                                                                                       

04:38 VIS not applicable for this client.                                                     mt4 

                                                                                                  

Outcome:                                                                                          

04:59 Discharge ordered by MD.                                                                sp3 

05:02 Discharged to home ambulatory,                                                          mt4 

05:02 Condition: stable                                                                           

05:02 Discharge instructions given to patient, Instructed on discharge instructions, follow       

      up and referral plans. Demonstrated understanding of instructions, follow-up care,          

      Prescriptions given X                                                                       

05:03 Patient left the ED.                                                                    mt4 

                                                                                                  

Signatures:                                                                                       

Dispatcher [employer]                           EDMS                                                 

Nelly Montemayor RN                         RN   3                                                  

Brigitte Zuniga MD MD   sp3                                                  

Faye Rascon                           j6                                                  

Navin Moore, RN                       RN   mt4                                                  

                                                                                                  

Corrections: (The following items were deleted from the chart)                                    

04:38 04:35 Pain: Denies pain. mt4                                                            mt4 

                                                                                                  

**************************************************************************************************